# Patient Record
Sex: FEMALE | Race: WHITE | NOT HISPANIC OR LATINO
[De-identification: names, ages, dates, MRNs, and addresses within clinical notes are randomized per-mention and may not be internally consistent; named-entity substitution may affect disease eponyms.]

---

## 2017-06-10 ENCOUNTER — HEALTH MAINTENANCE LETTER (OUTPATIENT)
Age: 37
End: 2017-06-10

## 2019-09-30 ENCOUNTER — HEALTH MAINTENANCE LETTER (OUTPATIENT)
Age: 39
End: 2019-09-30

## 2021-01-15 ENCOUNTER — HEALTH MAINTENANCE LETTER (OUTPATIENT)
Age: 41
End: 2021-01-15

## 2021-10-24 ENCOUNTER — HEALTH MAINTENANCE LETTER (OUTPATIENT)
Age: 41
End: 2021-10-24

## 2022-02-13 ENCOUNTER — HEALTH MAINTENANCE LETTER (OUTPATIENT)
Age: 42
End: 2022-02-13

## 2022-05-20 ENCOUNTER — OFFICE VISIT (OUTPATIENT)
Dept: PLASTIC SURGERY | Facility: CLINIC | Age: 42
End: 2022-05-20

## 2022-05-20 DIAGNOSIS — Z41.1 ELECTIVE PROCEDURE FOR UNACCEPTABLE COSMETIC APPEARANCE: Primary | ICD-10-CM

## 2022-05-20 NOTE — LETTER
Date:May 27, 2022      Provider requested that no letter be sent. Do not send.       [facility]

## 2022-05-20 NOTE — LETTER
5/20/2022       RE: Kelly Moreno  [address] [place] [address]     Dear Colleague,    Thank you for referring your patient, Kelly Moreno, to the [facility] at [facility]. Please see a copy of my visit note below.    Facial Plastic and Reconstructive Surgery      Kelly Moreno comes in for botox consultation.  She is 2 weeks out from Botox to her forehead and 2 months out from botox to her crow's feet.   She does have a history of trauma and laceration from fall on the right brow, the laceration has healed well but she describes a heavy brow on that side and that is doesn't raise as well as the left.   She has forehead rhytids on the left with a higher displaced brow on this side. I think this is compensation for some traumatic brow ptosis on the right.     I tried to raise the right a bit with treating the orbicularis oculi at the brow and crow's feet. I also used one unit in the frontalis on the left.       Procedure: Chemodenervation with Botulinum Toxin A  Indication: Undesirable Dynamic Rhytids  Injector: Mónica Rich MD      Informed consent was obtained.  The skin was cleaned with antimicrobial solution and a topical ice was placed.     The patient was asked to systematically engage the muscles in the area to be injected. The tuberculin needles were used for subdermal injection and hemostasis was obtained with light digital pressure when needed. The skin was cleaned.     A total of 15 units were injected.  Botulinum toxin A type: Botox     Please see procedure log.    The patient tolerated the procedure well and there were no complications. Post procedure care instructions were given to the patient.            Again, thank you for allowing me to participate in the care of your patient.      Sincerely,    Mónica Rich MD

## 2022-05-20 NOTE — PROGRESS NOTES
Facial Plastic and Reconstructive Surgery      Kelly Moreno comes in for botox consultation.  She is 2 weeks out from Botox to her forehead and 2 months out from botox to her crow's feet.   She does have a history of trauma and laceration from fall on the right brow, the laceration has healed well but she describes a heavy brow on that side and that is doesn't raise as well as the left.   She has forehead rhytids on the left with a higher displaced brow on this side. I think this is compensation for some traumatic brow ptosis on the right.     I tried to raise the right a bit with treating the orbicularis oculi at the brow and crow's feet. I also used one unit in the frontalis on the left.       Procedure: Chemodenervation with Botulinum Toxin A  Indication: Undesirable Dynamic Rhytids  Injector: Mónica Rich MD      Informed consent was obtained.  The skin was cleaned with antimicrobial solution and a topical ice was placed.     The patient was asked to systematically engage the muscles in the area to be injected. The tuberculin needles were used for subdermal injection and hemostasis was obtained with light digital pressure when needed. The skin was cleaned.     A total of 15 units were injected.  Botulinum toxin A type: Botox     Please see procedure log.    The patient tolerated the procedure well and there were no complications. Post procedure care instructions were given to the patient.

## 2022-05-20 NOTE — LETTER
May 20, 2022  Re: Kelly Moreno  1980    Dear Dr. Sheldon,    Thank you so much for referring Kelly Moreno to the [facility]. I had the pleasure of visiting with Kelly today.     Attached you will find a copy of my note. Please feel free to reach out to me with any questions, [phone].     Facial Plastic and Reconstructive Surgery      Kelly Moreno comes in for botox consultation.  She is 2 weeks out from Botox to her forehead and 2 months out from botox to her crow's feet.   She does have a history of trauma and laceration from fall on the right brow, the laceration has healed well but she describes a heavy brow on that side and that is doesn't raise as well as the left.   She has forehead rhytids on the left with a higher displaced brow on this side. I think this is compensation for some traumatic brow ptosis on the right.     I tried to raise the right a bit with treating the orbicularis oculi at the brow and crow's feet. I also used one unit in the frontalis on the left.       Procedure: Chemodenervation with Botulinum Toxin A  Indication: Undesirable Dynamic Rhytids  Injector: Mónica Rich MD      Informed consent was obtained.  The skin was cleaned with antimicrobial solution and a topical ice was placed.     The patient was asked to systematically engage the muscles in the area to be injected. The tuberculin needles were used for subdermal injection and hemostasis was obtained with light digital pressure when needed. The skin was cleaned.     A total of 15 units were injected.  Botulinum toxin A type: Botox     Please see procedure log.    The patient tolerated the procedure well and there were no complications. Post procedure care instructions were given to the patient.              Your trust in our practice and care is much appreciated.    Sincerely,  Mónica Rich MD

## 2022-05-31 RX ORDER — SPIRONOLACTONE 25 MG/1
TABLET ORAL DAILY
COMMUNITY

## 2022-06-10 ENCOUNTER — OFFICE VISIT (OUTPATIENT)
Dept: PLASTIC SURGERY | Facility: CLINIC | Age: 42
End: 2022-06-10

## 2022-06-10 DIAGNOSIS — Z41.1 ELECTIVE PROCEDURE FOR UNACCEPTABLE COSMETIC APPEARANCE: Primary | ICD-10-CM

## 2022-06-10 NOTE — LETTER
6/10/2022       RE: Kelly Moreno  [address] [place] [address]     Dear Colleague,    Thank you for referring your patient, Kelly Moreno, to the [facility] at . Please see a copy of my visit note below.    Facial Plastic and Reconstructive Surgery    Kelly comes in as her forehead Botox previously given elsewhere has worn off in the forehead. This does let me see that she has a right brow paresis, with minimal elevation of the right brow. This is consistent with the right brow injury right at the anatomic location of the frontal branch.     We thus worked to help manage her brow position. The right tail responded well to treatment to the orbic, now the medial brow is noticeable. I will treat there. I also will have to balance her weakness with the residual movement.     Procedure: Chemodenervation with Botulinum Toxin A  Indication: Undesirable Dynamic Rhytids  Injector: Mónica Rich MD      Informed consent was obtained.  The skin was cleaned with antimicrobial solution and a topical ice was placed.     The patient was asked to systematically engage the muscles in the area to be injected. The tuberculin needles were used for subdermal injection and hemostasis was obtained with light digital pressure when needed. The skin was cleaned.     A total of 15 units were injected.  Botulinum toxin A type: Botox    Please see procedure log.    The patient tolerated the procedure well and there were no complications. Post procedure care instructions were given to the patient.        Again, thank you for allowing me to participate in the care of your patient.      Sincerely,    Mónica Rich MD

## 2022-06-10 NOTE — LETTER
Date:June 14, 2022      Provider requested that no letter be sent. Do not send.       [facility]

## 2022-06-10 NOTE — LETTER
China 10, 2022  Re: Kelly Moreno  1980    Dear Dr. Sheldon,    Thank you so much for referring Kelly Moreno to the [facility]. I had the pleasure of visiting with Kelly today.     Attached you will find a copy of my note. Please feel free to reach out to me with any questions, [phone].     Facial Plastic and Reconstructive Surgery    Kelly comes in as her forehead Botox previously given elsewhere has worn off in the forehead. This does let me see that she has a right brow paresis, with minimal elevation of the right brow. This is consistent with the right brow injury right at the anatomic location of the frontal branch.     We thus worked to help manage her brow position. The right tail responded well to treatment to the orbic, now the medial brow is noticeable. I will treat there. I also will have to balance her weakness with the residual movement.     Procedure: Chemodenervation with Botulinum Toxin A  Indication: Undesirable Dynamic Rhytids  Injector: Mónica Rich MD      Informed consent was obtained.  The skin was cleaned with antimicrobial solution and a topical ice was placed.     The patient was asked to systematically engage the muscles in the area to be injected. The tuberculin needles were used for subdermal injection and hemostasis was obtained with light digital pressure when needed. The skin was cleaned.     A total of 15 units were injected.  Botulinum toxin A type: Botox    Please see procedure log.    The patient tolerated the procedure well and there were no complications. Post procedure care instructions were given to the patient.          Your trust in our practice and care is much appreciated.    Sincerely,  Mónica Rich MD

## 2022-06-10 NOTE — LETTER
Date:June 14, 2022      Provider requested that no letter be sent. Do not send.       [facility]

## 2022-06-10 NOTE — PROGRESS NOTES
Facial Plastic and Reconstructive Surgery    Kelly comes in as her forehead Botox previously given elsewhere has worn off in the forehead. This does let me see that she has a right brow paresis, with minimal elevation of the right brow. This is consistent with the right brow injury right at the anatomic location of the frontal branch.     We thus worked to help manage her brow position. The right tail responded well to treatment to the orbic, now the medial brow is noticeable. I will treat there. I also will have to balance her weakness with the residual movement.     Procedure: Chemodenervation with Botulinum Toxin A  Indication: Undesirable Dynamic Rhytids  Injector: Mónica Rich MD      Informed consent was obtained.  The skin was cleaned with antimicrobial solution and a topical ice was placed.     The patient was asked to systematically engage the muscles in the area to be injected. The tuberculin needles were used for subdermal injection and hemostasis was obtained with light digital pressure when needed. The skin was cleaned.     A total of 15 units were injected.  Botulinum toxin A type: Botox    Please see procedure log.    The patient tolerated the procedure well and there were no complications. Post procedure care instructions were given to the patient.

## 2022-08-01 ENCOUNTER — OFFICE VISIT (OUTPATIENT)
Dept: PLASTIC SURGERY | Facility: CLINIC | Age: 42
End: 2022-08-01

## 2022-08-01 DIAGNOSIS — Z41.1 ELECTIVE PROCEDURE FOR UNACCEPTABLE COSMETIC APPEARANCE: Primary | ICD-10-CM

## 2022-08-01 NOTE — LETTER
Date:August 2, 2022      Provider requested that no letter be sent. Do not send.       [facility]

## 2022-08-01 NOTE — LETTER
Date:August 2, 2022      Provider requested that no letter be sent. Do not send.       [facility]

## 2022-08-01 NOTE — LETTER
August 1, 2022  Re: Kelly Moreno  1980    Dear Dr. Sheldon,    Thank you so much for referring Kelly Moreno to the [facility]. I had the pleasure of visiting with Kelly today.     Attached you will find a copy of my note. Please feel free to reach out to me with any questions, [phone].     Facial Plastic and Reconstructive Surgery    Procedure: Chemodenervation with Botulinum Toxin A  Indication: Undesirable Dynamic Rhytids  Injector: Mónica Rich MD      Informed consent was obtained.  The skin was cleaned with antimicrobial solution and a topical ice was placed.     The patient was asked to systematically engage the muscles in the area to be injected. The tuberculin needles were used for subdermal injection and hemostasis was obtained with light digital pressure when needed. The skin was cleaned.     A total of 4 units were injected.  Botulinum toxin A type: Botox    Please see procedure log.    The patient tolerated the procedure well and there were no complications. Post procedure care instructions were given to the patient.          Your trust in our practice and care is much appreciated.    Sincerely,  Mónica Rich MD

## 2022-08-01 NOTE — LETTER
8/1/2022       RE: Kelly Moreno  [address] [place] [address]     Dear Colleague,    Thank you for referring your patient, Kelly Moreno, to the [facility] at [facility]. Please see a copy of my visit note below.    Facial Plastic and Reconstructive Surgery    Procedure: Chemodenervation with Botulinum Toxin A  Indication: Undesirable Dynamic Rhytids  Injector: Mónica Rich MD      Informed consent was obtained.  The skin was cleaned with antimicrobial solution and a topical ice was placed.     The patient was asked to systematically engage the muscles in the area to be injected. The tuberculin needles were used for subdermal injection and hemostasis was obtained with light digital pressure when needed. The skin was cleaned.     A total of 4 units were injected.  Botulinum toxin A type: Botox    Please see procedure log.    The patient tolerated the procedure well and there were no complications. Post procedure care instructions were given to the patient.        Again, thank you for allowing me to participate in the care of your patient.      Sincerely,    Mónica Rich MD

## 2022-08-01 NOTE — PROGRESS NOTES
Facial Plastic and Reconstructive Surgery    Procedure: Chemodenervation with Botulinum Toxin A  Indication: Undesirable Dynamic Rhytids  Injector: Mónica Rich MD      Informed consent was obtained.  The skin was cleaned with antimicrobial solution and a topical ice was placed.     The patient was asked to systematically engage the muscles in the area to be injected. The tuberculin needles were used for subdermal injection and hemostasis was obtained with light digital pressure when needed. The skin was cleaned.     A total of 4 units were injected.  Botulinum toxin A type: Botox    Please see procedure log.    The patient tolerated the procedure well and there were no complications. Post procedure care instructions were given to the patient.

## 2022-09-02 ENCOUNTER — OFFICE VISIT (OUTPATIENT)
Dept: PLASTIC SURGERY | Facility: CLINIC | Age: 42
End: 2022-09-02

## 2022-09-02 DIAGNOSIS — Z41.1 ELECTIVE PROCEDURE FOR UNACCEPTABLE COSMETIC APPEARANCE: Primary | ICD-10-CM

## 2022-09-02 NOTE — LETTER
September 2, 2022  Re: Kelly Moreno  1980    Dear Dr. Sheldon,    Thank you so much for referring Kelly Moreno to the [facility]. I had the pleasure of visiting with Kelly today.     Attached you will find a copy of my note. Please feel free to reach out to me with any questions, [phone].     Facial Plastic and Reconstructive Surgery    Procedure: Chemodenervation with Botulinum Toxin A  Indication: Undesirable Dynamic Rhytids  Injector: Mónica Rich MD      Informed consent was obtained.  The skin was cleaned with antimicrobial solution and a topical ice was placed.     The patient was asked to systematically engage the muscles in the area to be injected. The tuberculin needles were used for subdermal injection and hemostasis was obtained with light digital pressure when needed. The skin was cleaned.     A total of 25 units were injected.  Botulinum toxin A type: Botox    Please see procedure log.    The patient tolerated the procedure well and there were no complications. Post procedure care instructions were given to the patient.          Your trust in our practice and care is much appreciated.    Sincerely,  Mónica Rich MD

## 2022-09-02 NOTE — PROGRESS NOTES
Facial Plastic and Reconstructive Surgery    Procedure: Chemodenervation with Botulinum Toxin A  Indication: Undesirable Dynamic Rhytids  Injector: Mónica Rich MD      Informed consent was obtained.  The skin was cleaned with antimicrobial solution and a topical ice was placed.     The patient was asked to systematically engage the muscles in the area to be injected. The tuberculin needles were used for subdermal injection and hemostasis was obtained with light digital pressure when needed. The skin was cleaned.     A total of 25 units were injected.  Botulinum toxin A type: Botox    Please see procedure log.    The patient tolerated the procedure well and there were no complications. Post procedure care instructions were given to the patient.

## 2022-09-02 NOTE — LETTER
9/2/2022       RE: Kelly Moreno  [address] [place] [address]     Dear Colleague,    Thank you for referring your patient, Kelly Moreno, to the [facility] at [facility]. Please see a copy of my visit note below.    Facial Plastic and Reconstructive Surgery    Procedure: Chemodenervation with Botulinum Toxin A  Indication: Undesirable Dynamic Rhytids  Injector: Mónica Rich MD      Informed consent was obtained.  The skin was cleaned with antimicrobial solution and a topical ice was placed.     The patient was asked to systematically engage the muscles in the area to be injected. The tuberculin needles were used for subdermal injection and hemostasis was obtained with light digital pressure when needed. The skin was cleaned.     A total of 25 units were injected.  Botulinum toxin A type: Botox    Please see procedure log.    The patient tolerated the procedure well and there were no complications. Post procedure care instructions were given to the patient.        Again, thank you for allowing me to participate in the care of your patient.      Sincerely,    Mónica Rich MD

## 2022-09-02 NOTE — LETTER
Date:September 7, 2022      Provider requested that no letter be sent. Do not send.       [facility]

## 2022-09-02 NOTE — LETTER
Date:September 7, 2022      Provider requested that no letter be sent. Do not send.       [facility]

## 2022-10-16 ENCOUNTER — HEALTH MAINTENANCE LETTER (OUTPATIENT)
Age: 42
End: 2022-10-16

## 2022-10-28 ENCOUNTER — OFFICE VISIT (OUTPATIENT)
Dept: PLASTIC SURGERY | Facility: CLINIC | Age: 42
End: 2022-10-28

## 2022-10-28 DIAGNOSIS — Z41.1 ENCOUNTER FOR COSMETIC PROCEDURE: Primary | ICD-10-CM

## 2022-10-28 NOTE — LETTER
Date:October 29, 2022      Provider requested that no letter be sent. Do not send.       [facility]

## 2022-10-28 NOTE — LETTER
10/28/2022       RE: Kelly Moreno  [address] [place] [address]     Dear Colleague,    Thank you for referring your patient, Kelly Moreno, to the [facility] at [facility]. Please see a copy of my visit note below.    Facial Plastic and Reconstructive Surgery    Procedure: Chemodenervation with Botulinum Toxin A  Indication: Undesirable Dynamic Rhytids  Injector: Mónica Rich MD      Informed consent was obtained.  The skin was cleaned with antimicrobial solution and a topical ice was placed.     The patient was asked to systematically engage the muscles in the area to be injected. The tuberculin needles were used for subdermal injection and hemostasis was obtained with light digital pressure when needed. The skin was cleaned.     A total of 11 units were injected.  Botulinum toxin A type: Botox  Medial brow on right, crease above brow on the right and lines on the forehead left    Please see procedure log.    The patient tolerated the procedure well and there were no complications. Post procedure care instructions were given to the patient.        Again, thank you for allowing me to participate in the care of your patient.      Sincerely,    Mónica Rich MD

## 2022-10-28 NOTE — PROGRESS NOTES
Facial Plastic and Reconstructive Surgery    Procedure: Chemodenervation with Botulinum Toxin A  Indication: Undesirable Dynamic Rhytids  Injector: Mónica Rich MD      Informed consent was obtained.  The skin was cleaned with antimicrobial solution and a topical ice was placed.     The patient was asked to systematically engage the muscles in the area to be injected. The tuberculin needles were used for subdermal injection and hemostasis was obtained with light digital pressure when needed. The skin was cleaned.     A total of 11 units were injected.  Botulinum toxin A type: Botox  Medial brow on right, crease above brow on the right and lines on the forehead left    Please see procedure log.    The patient tolerated the procedure well and there were no complications. Post procedure care instructions were given to the patient.

## 2022-10-28 NOTE — LETTER
Date:October 29, 2022      Provider requested that no letter be sent. Do not send.       [facility]

## 2022-10-28 NOTE — LETTER
October 28, 2022  Re: Kelly Moreno  1980    Dear Dr. Sheldon,    Thank you so much for referring Kelly Moreno to the [facility]. I had the pleasure of visiting with Kelly today.     Attached you will find a copy of my note. Please feel free to reach out to me with any questions, [phone].     Facial Plastic and Reconstructive Surgery    Procedure: Chemodenervation with Botulinum Toxin A  Indication: Undesirable Dynamic Rhytids  Injector: Mónica Rich MD      Informed consent was obtained.  The skin was cleaned with antimicrobial solution and a topical ice was placed.     The patient was asked to systematically engage the muscles in the area to be injected. The tuberculin needles were used for subdermal injection and hemostasis was obtained with light digital pressure when needed. The skin was cleaned.     A total of 11 units were injected.  Botulinum toxin A type: Botox  Medial brow on right, crease above brow on the right and lines on the forehead left    Please see procedure log.    The patient tolerated the procedure well and there were no complications. Post procedure care instructions were given to the patient.          Your trust in our practice and care is much appreciated.    Sincerely,  Mónica Rich MD

## 2022-12-09 ENCOUNTER — OFFICE VISIT (OUTPATIENT)
Dept: PLASTIC SURGERY | Facility: CLINIC | Age: 42
End: 2022-12-09

## 2022-12-09 DIAGNOSIS — Z41.1 ELECTIVE PROCEDURE FOR UNACCEPTABLE COSMETIC APPEARANCE: Primary | ICD-10-CM

## 2022-12-09 NOTE — PROGRESS NOTES
Facial Plastic and Reconstructive Surgery    Kelly comes in for a touch up today.  We have achieved really good brow position and symmetry with the medial brow on the right lifting up.  She does however have residual movement of the brows with some elevation and notable rhytids on the left.  I did some Botox today to try to help this out.  We did discuss that there is a balance between the movement we need to have brow position be ideal and minimize the rhytids in the forehead.    Procedure: Chemodenervation with Botulinum Toxin A  Indication: Undesirable Dynamic Rhytids  Injector: Mónica Rich MD      Informed consent was obtained.  The skin was cleaned with antimicrobial solution and a topical ice was placed.     The patient was asked to systematically engage the muscles in the area to be injected. The tuberculin needles were used for subdermal injection and hemostasis was obtained with light digital pressure when needed. The skin was cleaned.     A total of 22 units were injected.  Botulinum toxin A type: Botox    Please see procedure log.    The patient tolerated the procedure well and there were no complications. Post procedure care instructions were given to the patient.

## 2022-12-09 NOTE — LETTER
Date:December 9, 2022      Provider requested that no letter be sent. Do not send.       [facility]

## 2022-12-09 NOTE — LETTER
December 9, 2022  Re: Kelly Moreno  1980    Dear Dr. Sheldon,    Thank you so much for referring Kelly Moreno to the [facility]. I had the pleasure of visiting with Kelly today.     Attached you will find a copy of my note. Please feel free to reach out to me with any questions, [phone].     Facial Plastic and Reconstructive Surgery    Kelly comes in for a touch up today.  We have achieved really good brow position and symmetry with the medial brow on the right lifting up.  She does however have residual movement of the brows with some elevation and notable rhytids on the left.  I did some Botox today to try to help this out.  We did discuss that there is a balance between the movement we need to have brow position be ideal and minimize the rhytids in the forehead.    Procedure: Chemodenervation with Botulinum Toxin A  Indication: Undesirable Dynamic Rhytids  Injector: Mónica Rich MD      Informed consent was obtained.  The skin was cleaned with antimicrobial solution and a topical ice was placed.     The patient was asked to systematically engage the muscles in the area to be injected. The tuberculin needles were used for subdermal injection and hemostasis was obtained with light digital pressure when needed. The skin was cleaned.     A total of 22 units were injected.  Botulinum toxin A type: Botox    Please see procedure log.    The patient tolerated the procedure well and there were no complications. Post procedure care instructions were given to the patient.          Your trust in our practice and care is much appreciated.    Sincerely,  Mónica Rich MD

## 2022-12-09 NOTE — LETTER
Date:December 9, 2022      Provider requested that no letter be sent. Do not send.       [facility]

## 2022-12-09 NOTE — LETTER
12/9/2022       RE: Kelly Moreno  [address] [place] [address]     Dear Colleague,    Thank you for referring your patient, Kelly Moreno, to the [facility] at [facility]. Please see a copy of my visit note below.    Facial Plastic and Reconstructive Surgery    Kelly comes in for a touch up today.  We have achieved really good brow position and symmetry with the medial brow on the right lifting up.  She does however have residual movement of the brows with some elevation and notable rhytids on the left.  I did some Botox today to try to help this out.  We did discuss that there is a balance between the movement we need to have brow position be ideal and minimize the rhytids in the forehead.    Procedure: Chemodenervation with Botulinum Toxin A  Indication: Undesirable Dynamic Rhytids  Injector: Mónica Rich MD      Informed consent was obtained.  The skin was cleaned with antimicrobial solution and a topical ice was placed.     The patient was asked to systematically engage the muscles in the area to be injected. The tuberculin needles were used for subdermal injection and hemostasis was obtained with light digital pressure when needed. The skin was cleaned.     A total of 22 units were injected.  Botulinum toxin A type: Botox    Please see procedure log.    The patient tolerated the procedure well and there were no complications. Post procedure care instructions were given to the patient.        Again, thank you for allowing me to participate in the care of your patient.      Sincerely,    Mónica Rich MD

## 2023-03-26 ENCOUNTER — HEALTH MAINTENANCE LETTER (OUTPATIENT)
Age: 43
End: 2023-03-26

## 2024-01-13 ENCOUNTER — HEALTH MAINTENANCE LETTER (OUTPATIENT)
Age: 44
End: 2024-01-13

## 2024-06-01 ENCOUNTER — HEALTH MAINTENANCE LETTER (OUTPATIENT)
Age: 44
End: 2024-06-01

## 2025-06-14 ENCOUNTER — HEALTH MAINTENANCE LETTER (OUTPATIENT)
Age: 45
End: 2025-06-14